# Patient Record
Sex: MALE | Race: WHITE | URBAN - METROPOLITAN AREA
[De-identification: names, ages, dates, MRNs, and addresses within clinical notes are randomized per-mention and may not be internally consistent; named-entity substitution may affect disease eponyms.]

---

## 2023-07-06 ENCOUNTER — OFFICE VISIT (OUTPATIENT)
Dept: SURGERY | Facility: CLINIC | Age: 58
End: 2023-07-06

## 2023-07-06 ENCOUNTER — LAB ENCOUNTER (OUTPATIENT)
Dept: LAB | Age: 58
End: 2023-07-06
Attending: SURGERY
Payer: MEDICAID

## 2023-07-06 DIAGNOSIS — R97.20 ELEVATED PSA: Primary | ICD-10-CM

## 2023-07-06 DIAGNOSIS — R97.20 ELEVATED PSA: ICD-10-CM

## 2023-07-06 LAB
APPEARANCE: CLEAR
BILIRUBIN: NEGATIVE
GLUCOSE (URINE DIPSTICK): NEGATIVE MG/DL
KETONES (URINE DIPSTICK): NEGATIVE MG/DL
LEUKOCYTES: NEGATIVE
MULTISTIX LOT#: NORMAL NUMERIC
NITRITE, URINE: NEGATIVE
OCCULT BLOOD: NEGATIVE
PH, URINE: 6 (ref 4.5–8)
PROTEIN (URINE DIPSTICK): NEGATIVE MG/DL
PSA FREE MFR SERPL: 13 %
PSA FREE SERPL-MCNC: 0.91 NG/ML
PSA SERPL-MCNC: 7.02 NG/ML (ref ?–4)
SPECIFIC GRAVITY: <=1.005 (ref 1–1.03)
URINE-COLOR: YELLOW
UROBILINOGEN,SEMI-QN: 0.2 MG/DL (ref 0–1.9)

## 2023-07-06 PROCEDURE — 99243 OFF/OP CNSLTJ NEW/EST LOW 30: CPT | Performed by: SURGERY

## 2023-07-06 PROCEDURE — 84153 ASSAY OF PSA TOTAL: CPT

## 2023-07-06 PROCEDURE — 36415 COLL VENOUS BLD VENIPUNCTURE: CPT

## 2023-07-06 PROCEDURE — 81003 URINALYSIS AUTO W/O SCOPE: CPT | Performed by: SURGERY

## 2023-07-06 PROCEDURE — 84154 ASSAY OF PSA FREE: CPT

## 2023-07-06 NOTE — PROGRESS NOTES
Urology Clinic Note - New Patient    Referring Provider:  No referring provider defined for this encounter. Primary Care Provider:  No primary care provider on file. Chief Complaint:   Elevated PSA    HPI:   Imani Higgins is a 62year old male with history of OA referred for elevated PSA. He reportedly had an elevated PSA at his primary care physician, but no labs to review today and unable to find in epic. He otherwise feels well and denies gross hematuria or voiding symptoms. PSA:  No results found for: PSA, PERCENTPSA, PSAS, PSAULTRA, QPSA, PSATOT, TOTPSADX, TOTPSASCREEN     Prior abdominal surgeries: None  Prostate size: Approximately 45 g on LUANA today, right mid to apex nodule    AUA symptom score is 2/mostly satisfied with LUTS. Family history of malignancy: Denies    Urinalysis (clinic dip): Negative    History:   No past medical history on file. No past surgical history on file. No family history on file. Social History     Socioeconomic History    Marital status: Single       Medications (Active prior to today's visit):  No current outpatient medications on file. Allergies:  Not on File      Review of Systems:   A comprehensive 10-point review of systems was completed. Pertinent positives and negatives are noted in the the HPI. Physical Exam:   CONSTITUTIONAL: Well developed, well nourished, in no acute distress  NEUROLOGIC: Alert and oriented  HEAD: Normocephalic, atraumatic  EYES: Sclera non-icteric  ENT: Hearing intact, moist mucous membranes  NECK: No obvious goiter or masses  RESPIRATORY: Normal respiratory effort  SKIN: No evident rashes  ABDOMEN: Soft, non-tender, non-distended  GENITOURINARY: Normal phallus, orthotopic meatus, normal bilateral testicles  DIGITAL RECTAL EXAM: ~45 gram prostate, right mid/apex nodule    Assessment & Plan:   Imani Higgins is a 62year old male with history of OA referred for elevated PSA.   He reportedly had an elevated PSA at his primary care physician, but no labs to review today and unable to find in epic. He otherwise feels well and denies gross hematuria or voiding symptoms. Right mid/apex nodule on LUANA today. I discussed PSA screening in detail. PSA screening is our best tool to detect prostate cancer, as prostate cancer typically has no other signs or symptoms. I mentioned that prostate cells produce PSA that gets absorbed into the blood. I discussed that multiple things besides prostate cancer can elevate the PSA, however. These include prostate enlargement, prostate infection or inflammation, recent ejaculation, recent cycling or prostate manipulation, recent urologic procedure or Mercado catheter placement. Because of this, we typically do not act on a single elevated PSA and look more at trends in the PSA. We will track down the PCP PSA and repeat another PSA as well. Given prostate nodule on LUANA we will likely proceed with prostate biopsy or MRI if his PSA is truly elevated. -Repeat PSA  -Call with results of above, likely prostate biopsy or MRI if elevated given nodule on LUANA      Thank you for this consult. I have personally reviewed all relevant medical records, labs, and imaging. In total, 30 minutes were spent on this patient encounter (including chart review, patient history, physical, and counseling, documentation, and communication). Scotty Monday.  Marilee Flores MD  Staff Urologist  [facility]  Office: [phone]

## 2023-07-06 NOTE — PROGRESS NOTES
Can someone contact this patient and let him know his PSA was elevated and that I would like to proceed with prostate biopsy given the elevated PSA plus the nodule on LUANA today. I went over this with him in clinic today but let me know if he has additional questions. Thanks,  MB    Awaiting original PSA, but today's PSA is 7.02. Free PSA 13%. Given these labs plus nodule on LUANA today I recommend scheduling prostate biopsy. We will also still track down the original PSA.

## 2023-07-07 ENCOUNTER — TELEPHONE (OUTPATIENT)
Dept: SURGERY | Facility: CLINIC | Age: 58
End: 2023-07-07

## 2023-07-07 RX ORDER — CIPROFLOXACIN 500 MG/1
500 TABLET, FILM COATED ORAL 2 TIMES DAILY
Qty: 6 TABLET | Refills: 0 | Status: SHIPPED | OUTPATIENT
Start: 2023-07-07

## 2023-07-07 RX ORDER — CEFDINIR 300 MG/1
300 CAPSULE ORAL EVERY 12 HOURS
Qty: 6 CAPSULE | Refills: 0 | Status: SHIPPED | OUTPATIENT
Start: 2023-07-07 | End: 2023-07-10

## 2023-07-07 NOTE — TELEPHONE ENCOUNTER
Relayed PSA result.    He agreed to proceed with bx  ABT orders placed  Prostate biopsy instructions by mail  Medication list reviewed  Allergies: ARMIDA  Offered 8/15 Tuesday at [id number] [facility]

## 2023-07-21 ENCOUNTER — TELEPHONE (OUTPATIENT)
Dept: SURGERY | Facility: CLINIC | Age: 58
End: 2023-07-21

## 2023-07-21 NOTE — TELEPHONE ENCOUNTER
Talked to pt letting him know that his 5/15 bx needs to be resched due to  being in [place] [id number]. Offered pt 8/18 at 12:30pm.      He will call back after he talks to the person who drives to/from dr boyer.

## 2023-08-21 ENCOUNTER — PROCEDURE (OUTPATIENT)
Dept: SURGERY | Facility: CLINIC | Age: 58
End: 2023-08-21

## 2023-08-21 VITALS — DIASTOLIC BLOOD PRESSURE: 90 MMHG | SYSTOLIC BLOOD PRESSURE: 154 MMHG

## 2023-08-21 DIAGNOSIS — R97.20 ELEVATED PSA: Primary | ICD-10-CM

## 2023-08-21 PROCEDURE — 3077F SYST BP >= 140 MM HG: CPT | Performed by: SURGERY

## 2023-08-21 PROCEDURE — 55700 BIOPSY OF PROSTATE,NEEDLE/PUNCH: CPT | Performed by: SURGERY

## 2023-08-21 PROCEDURE — 76872 US TRANSRECTAL: CPT | Performed by: SURGERY

## 2023-08-21 PROCEDURE — 3080F DIAST BP >= 90 MM HG: CPT | Performed by: SURGERY

## 2023-08-21 RX ORDER — ASPIRIN 81 MG/1
81 TABLET ORAL DAILY
COMMUNITY

## 2023-08-21 RX ORDER — ATORVASTATIN CALCIUM 20 MG/1
20 TABLET, FILM COATED ORAL DAILY
Qty: 30 TABLET | Refills: 11 | COMMUNITY
Start: 2023-04-18 | End: 2024-04-17

## 2023-08-21 NOTE — PROCEDURES
Clinic Procedure Note    INDICATIONS:   Kishore Zamorano is a 62year old male with history of OA referred for elevated PSA. PSA up to 7.02. Right mid/apex nodule on LUANA. PSA:  Lab Results   Component Value Date    PSA 7.02 (H) 2023        PROCEDURE:    1. Transrectal ultrasound of the prostate    2. Periprostatic nerve block, bilateral    3. Ultrasound-guided needle placement    4. Prostate needle biopsy    DATE OF PROCEDURE: 2023     PRE-PROCEDURE DIAGNOSIS: Elevated PSA    POST-PROCEDURE DIAGNOSIS: Same     SURGEON: Carolyn Guzman MD    FINDINGS:    Digital Rectal Exam: ~60g prostate, right apex nodule    Prostate Ultrasound: 61mL prostate volume    SPECIMEN: Prostate biopsies (12 standard biopsies)    PROCEDURE:   Patient was brought to the procedure suite and a time-out was performed confirming the patient, , and procedure to be performed. The risks and benefits of the procedure were once again described to patient including bleeding (hematospermia, hematochezia, and hematuria), infection (including severe sepsis), temporary erectile dysfunction, and pelvic pain. The patient agreed to proceed and informed consent form was signed. He took 3 doses of Cefdinir and Ciprofloxacin since yesterday, including 2 hours before this procedure, and will take 3 additional doses of each antibiotic after the procedure. He was placed on his side with knees towards his chest. A digital rectal examination was performed, with findings as above. The well-lubricated ultrasound probe was inserted into the rectum, and the prostate was visualized and measured (as above). Then, 5 mL of 1% lidocaine without epinephrine was injected into the periprostatic nerve bundles on each side (10 mL total) using ultrasound guidance. Using ultrasound guidance, core biopsies were taken from the lateral and medial aspects of the base, middle, and apex of each lobe. These were labeled and sent to pathology for analysis.     The probe was removed from the rectum and a finger was placed in the rectum to hold pressure on the prostate for several minutes. There was no active bleeding after removal of the finger. There were no complications and the patient tolerated the biopsy without issue. PLAN:   He will follow up in 1 week to review his pathology (or I will call him if it results sooner) and determine the best management course. Post-procedure instructions were given and he knows to go to the ED if he experiences fevers/chills, has significant bleeding, or is unable to urinate. Royer Sanchez.  Krunal Woody MD  Staff Urologist  [place] [id number]  Office: [phone]

## 2023-08-31 ENCOUNTER — OFFICE VISIT (OUTPATIENT)
Dept: SURGERY | Facility: CLINIC | Age: 58
End: 2023-08-31

## 2023-08-31 DIAGNOSIS — R97.20 ELEVATED PSA: Primary | ICD-10-CM

## 2023-08-31 PROCEDURE — 99213 OFFICE O/P EST LOW 20 MIN: CPT | Performed by: SURGERY
